# Patient Record
Sex: FEMALE | Race: WHITE | NOT HISPANIC OR LATINO | URBAN - METROPOLITAN AREA
[De-identification: names, ages, dates, MRNs, and addresses within clinical notes are randomized per-mention and may not be internally consistent; named-entity substitution may affect disease eponyms.]

---

## 2018-03-30 ENCOUNTER — THERAPY VISIT (OUTPATIENT)
Dept: PHYSICAL THERAPY | Facility: CLINIC | Age: 28
End: 2018-03-30
Payer: COMMERCIAL

## 2018-03-30 DIAGNOSIS — M54.50 RIGHT-SIDED LOW BACK PAIN WITHOUT SCIATICA: Primary | ICD-10-CM

## 2018-03-30 PROCEDURE — 97014 ELECTRIC STIMULATION THERAPY: CPT | Mod: GP | Performed by: PHYSICAL THERAPIST

## 2018-03-30 PROCEDURE — 97110 THERAPEUTIC EXERCISES: CPT | Mod: GP | Performed by: PHYSICAL THERAPIST

## 2018-03-30 PROCEDURE — 97161 PT EVAL LOW COMPLEX 20 MIN: CPT | Mod: GP | Performed by: PHYSICAL THERAPIST

## 2018-03-30 NOTE — PROGRESS NOTES
"[facility] Initial Evaluation  Subjective:  Physical Therapy Initial Evaluation   3/30/18  Fe Jaeger DC Precautions/Restrictions/MD instructions: SIJ/LBP E and T   Therapist Impression:   Pt is a 27 y/o female, with 6 month history of R sided LBP/glute pain . Pt presents with pain, reduced ROM, weakness, reduced motor control consistent with mechanical LBP. These impairments limit their ability to sit, work, and perform school duties. Skilled PT services necessary in order to reduce impairments and improve independent function.    Subjective:   Chief Complaint: Pt reports 6 month hx of R glute and lateral back \"heaviness.\" Notes that it seems like a ball of tightness and sometimes throbbing pain. Notes that walking can completely relieve the pain. Intermittent tingling in R leg. No change in bowel/bladder function  DOI/onset: Sept/Oct 2017 DOS: none  Location: see above Quality: throbbing, achiness  Frequency: with sitting Radiates: some achiness/pain down R LE to foot  Pain scale: Rest 0/10 Activity 7-8/10   Time of day: PM worse than AM Sleeping: relief from sleeping   Exacerbated by: sitting >10 min Relieved by: walking Progression: not much change  Previous Treatment: chiro, yoga Effect of prior treatment: helpful in the short term but hasn't had an effect beyond 2 weeks   PMH and/or surgical history: generalized anxiety   Imaging: none    Occupation: [employer] student Job duties: prolonged sitting    Current HEP/exercise regimen: enjoys hiking, doing intense yoga Patient's goals: reduce pain, return to normal yoga and hiking  Medications: none  General health as reported by patient: good  Return to MD: PRN  HPI                  Objective:  Lumbar Spine/SIJ Evaluation:    Gait: Incr L SGL    Posture: incr lordosis, L SGL     Lumbar AROM:   Motion ROM Pain   Flexion To floor nil   Extension Mod loss nil   Side Bend L WNL R stretchy/pain   Side Bend R WNL nil   Side Gliding L WNL nil "   Side Gliding R Min loss R side     Myotomes/Dermatomes/Reflexes:  WNL and symmetrical       Hip PROM hyper    SI Joint Provocation Tests:  Negative    Muscle Activation    L R   TrA fair poor   Glute Max 3+/5 3+/5     Painful R glute med testing    System    Physical Exam    General     ROS    Assessment/Plan:    Patient is a 26 year old female with lumbar complaints.    Patient has the following significant findings with corresponding treatment plan.                Diagnosis 1:  Mechanical LBP  Pain -  hot/cold therapy, electric stimulation, manual therapy, splint/taping/bracing/orthotics, education, directional preference exercise and home program  Decreased ROM/flexibility - manual therapy and therapeutic exercise  Decreased joint mobility - manual therapy and therapeutic exercise  Decreased strength - therapeutic exercise and therapeutic activities  Decreased proprioception - neuro re-education and therapeutic activities  Impaired gait - gait training  Impaired muscle performance - neuro re-education  Decreased function - therapeutic activities  Impaired posture - neuro re-education    Therapy Evaluation Codes:   1) History comprised of:   Personal factors that impact the plan of care:      Past/current experiences and Time since onset of symptoms.    Comorbidity factors that impact the plan of care are:      None.     Medications impacting care: None.  2) Examination of Body Systems comprised of:   Body structures and functions that impact the plan of care:      Lumbar spine and Pelvis.   Activity limitations that impact the plan of care are:      Driving, Sitting and Working.  3) Clinical presentation characteristics are:   Stable/Uncomplicated.  4) Decision-Making    Low complexity using standardized patient assessment instrument and/or measureable assessment of functional outcome.  Cumulative Therapy Evaluation is: Low complexity.    Previous and current functional limitations:  (See Goal Flow Sheet for  this information)    Short term and Long term goals: (See Goal Flow Sheet for this information)     Communication ability:  Patient appears to be able to clearly communicate and understand verbal and written communication and follow directions correctly.  Treatment Explanation - The following has been discussed with the patient:   RX ordered/plan of care  Anticipated outcomes  Possible risks and side effects  This patient would benefit from PT intervention to resume normal activities.   Rehab potential is excellent.    Frequency:  2 X a month, once daily  Duration:  for 3 months  Discharge Plan:  Achieve all LTG.  Independent in home treatment program.  Reach maximal therapeutic benefit.    Please refer to the daily flowsheet for treatment today, total treatment time and time spent performing 1:1 timed codes.

## 2018-03-30 NOTE — MR AVS SNAPSHOT
"              After Visit Summary   3/30/2018    Ms. Sheila Cruz    MRN: [id number]           Patient Information     Date Of Birth          1990        Visit Information        Provider Department      3/30/2018 8:20 AM Jeremy Melvin PT [facility] Physical [facility]        Today's Diagnoses     Right-sided low back pain without sciatica    -  1       Follow-ups after your visit        Who to contact     If you have questions or need follow up information about today's clinic visit or your schedule please contact [facility]THLETIC [facility] PHYSICAL [facility] directly at [phone].  Normal or non-critical lab and imaging results will be communicated to you by "[employer]hart, letter or phone within 4 business days after the clinic has received the results. If you do not hear from us within 7 days, please contact the clinic through "[employer]hart or phone. If you have a critical or abnormal lab result, we will notify you by phone as soon as possible.  Submit refill requests through [employer] or call your pharmacy and they will forward the refill request to us. Please allow 3 business days for your refill to be completed.          Additional Information About Your Visit        MyChart Information     [employer] lets you send messages to your doctor, view your test results, renew your prescriptions, schedule appointments and more. To sign up, go to [email or web address] . Click on \"Log in\" on the left side of the screen, which will take you to the Welcome page. Then click on \"Sign up Now\" on the right side of the page.     You will be asked to enter the access code listed below, as well as some personal information. Please follow the directions to create your username and password.     Your access code is: [email or web address]  Expires: 2018  9:13 AM     Your access code will  in 90 days. If you need help or a new code, please call your [facility] clinic or " [phone].        Care EveryWhere ID     This is your Care EveryWhere ID. This could be used by other organizations to access your [facility] medical records  [id number]         Blood Pressure from Last 3 Encounters:   No data found for BP    Weight from Last 3 Encounters:   No data found for Wt              We Performed the Following     ELECTRIC STIMULATION THERAPY     HC PT EVAL, LOW COMPLEXITY     NANCY INITIAL EVAL REPORT     THERAPEUTIC EXERCISES        Primary Care Provider    None Specified       No primary provider on file.        Equal Access to Services     PEG WILLIAMSON : Hadii aad ku hadasho Soomaali, waaxda luqadaha, qaybta kaalmada adeegyada, waxay berhanein hayashian misha arguetaroroena ely . So [facility] [phone].    ATENCIÓN: Si habla espcony, tiene a vang disposición servicios gratuitos de asistencia lingüística. Llame al [phone].    We comply with applicable federal civil rights laws and [place] laws. We do not discriminate on the basis of race, color, national origin, age, disability, sex, sexual orientation, or gender identity.            Thank you!     Thank you for choosing [facility] PHYSICAL THERAP[facility]  for your care. Our goal is always to provide you with excellent care. Hearing back from our patients is one way we can continue to improve our services. Please take a few minutes to complete the written survey that you may receive in the mail after your visit with us. Thank you!             Your Updated Medication List - Protect others around you: Learn how to safely use, store and throw away your medicines at www.disposemymeds.org.      Notice  As of 3/30/2018  9:13 AM    You have not been prescribed any medications.

## 2018-03-30 NOTE — LETTER
"[facility]IC [facility] PHYSICAL THERAPY  [address] [address]  [phone]    2018    Re: Sheila Cruz   :   1990  MRN:  [id number]   REFERRING PHYSICIAN:   Fe Jaeger    [facility]    Date of Initial Evaluation:  2018  Visits:  Rxs Used: 1  Reason for Referral:  Right-sided low back pain without sciatica    EVALUATION SUMMARY    [facility] Initial Evaluation  Subjective:  Physical Therapy Initial Evaluation   3/30/18  Fe Jaeger DC Precautions/Restrictions/MD instructions: SIJ/LBP E and T   Therapist Impression:   Pt is a 27 y/o female, with 6 month history of R sided LBP/glute pain . Pt presents with pain, reduced ROM, weakness, reduced motor control consistent with mechanical LBP. These impairments limit their ability to sit, work, and perform school duties. Skilled PT services necessary in order to reduce impairments and improve independent function.    Subjective:   Chief Complaint: Pt reports 6 month hx of R glute and lateral back \"heaviness.\" Notes that it seems like a ball of tightness and sometimes throbbing pain. Notes that walking can completely relieve the pain. Intermittent tingling in R leg. No change in bowel/bladder function  DOI/onset: Sept/Oct 2017 DOS: none  Location: see above Quality: throbbing, achiness  Frequency: with sitting Radiates: some achiness/pain down R LE to foot  Pain scale: Rest 0/10 Activity 7-8/10   Time of day: PM worse than AM Sleeping: relief from sleeping   Exacerbated by: sitting >10 min Relieved by: walking Progression: not much change  Previous Treatment: chiro, yoga Effect of prior treatment: helpful in the short term but hasn't had an effect beyond 2 weeks     Re: Sheila Cruz   :   1990    PMH and/or surgical history: generalized anxiety   Imaging: none    Occupation: [employer] student Job " duties: prolonged sitting    Current HEP/exercise regimen: enjoys hiking, doing intense yoga Patient's goals: reduce pain, return to normal yoga and hiking  Medications: none  General health as reported by patient: good  Return to MD: PRN  HPI                  Objective:  Lumbar Spine/SIJ Evaluation:    Gait: Incr L SGL  Posture: incr lordosis, L SGL   Lumbar AROM:   Motion ROM Pain   Flexion To floor nil   Extension Mod loss nil   Side Bend L WNL R stretchy/pain   Side Bend R WNL nil   Side Gliding L WNL nil   Side Gliding R Min loss R side     Myotomes/Dermatomes/Reflexes:  WNL and symmetrical    Hip PROM hyper  SI Joint Provocation Tests:  Negative  Muscle Activation    L R   TrA fair poor   Glute Max 3+/5 3+/5     Painful R glute med testing  Assessment/Plan:    Patient is a 26 year old female with lumbar complaints.    Patient has the following significant findings with corresponding treatment plan.                Diagnosis 1:  Mechanical LBP  Pain -  hot/cold therapy, electric stimulation, manual therapy, splint/taping/bracing/orthotics, education, directional preference exercise and home program  Decreased ROM/flexibility - manual therapy and therapeutic exercise  Re: Sheila Cruz   :   1990    Decreased joint mobility - manual therapy and therapeutic exercise  Decreased strength - therapeutic exercise and therapeutic activities  Decreased proprioception - neuro re-education and therapeutic activities  Impaired gait - gait training  Impaired muscle performance - neuro re-education  Decreased function - therapeutic activities  Impaired posture - neuro re-education  Therapy Evaluation Codes:   1) History comprised of:Personal factors that impact the plan of care:  Past/current experiences and Time since onset of symptoms. Comorbidity factors that impact the plan of care are:  None.  Medications impacting care: None.  2) Examination of Body Systems comprised of:Body structures and functions that  impact the plan of care:  Lumbar spine and Pelvis.Activity limitations that impact the plan of care are:  Driving, Sitting and Working.  3) Clinical presentation characteristics are: Stable/Uncomplicated.  4) Decision-MakingLow complexity using standardized patient assessment instrument and/or measureable assessment of functional outcome.  Cumulative Therapy Evaluation is: Low complexity.  Previous and current functional limitations:  (See Goal Flow Sheet for this information)    Short term and Long term goals: (See Goal Flow Sheet for this information)   Communication ability:  Patient appears to be able to clearly communicate and understand verbal and written communication and follow directions correctly.  Treatment Explanation - The following has been discussed with the patient:   RX ordered/plan of care  Anticipated outcomes  Possible risks and side effects  This patient would benefit from PT intervention to resume normal activities.   Rehab potential is excellent.  Frequency:  2 X a month, once daily  Duration:  for 3 months  Discharge Plan:  Achieve all LTG.  Independent in home treatment program.  Reach maximal therapeutic benefit.  Please refer to the daily flowsheet for treatment today, total treatment time and time spent performing 1:1 timed codes.   Thank you for your referral.    INQUIRIES  Therapist: Jeremy Melvin DPT  [facility] PHYSICAL THERAPY  [address] [place] [id number]  Phone: [phone]  Fax: [phone]

## 2019-01-10 ENCOUNTER — COMMUNICATION - HEALTHEAST (OUTPATIENT)
Dept: TELEHEALTH | Facility: CLINIC | Age: 29
End: 2019-01-10

## 2019-01-10 ENCOUNTER — OFFICE VISIT - HEALTHEAST (OUTPATIENT)
Dept: FAMILY MEDICINE | Facility: CLINIC | Age: 29
End: 2019-01-10

## 2019-01-10 DIAGNOSIS — F41.1 GENERALIZED ANXIETY DISORDER: ICD-10-CM

## 2019-01-10 DIAGNOSIS — Z12.4 SCREENING FOR CERVICAL CANCER: ICD-10-CM

## 2019-01-10 DIAGNOSIS — Z00.00 ROUTINE GENERAL MEDICAL EXAMINATION AT A HEALTH CARE FACILITY: ICD-10-CM

## 2019-01-10 DIAGNOSIS — L68.0 HIRSUTISM: ICD-10-CM

## 2019-01-10 LAB
FSH SERPL-ACNC: 6.2 MIU/ML
HBA1C MFR BLD: 4.8 % (ref 3.5–6)
PROLACTIN SERPL-MCNC: 9.9 NG/ML (ref 0–20)
TSH SERPL DL<=0.005 MIU/L-ACNC: 1.53 UIU/ML (ref 0.3–5)

## 2019-01-10 ASSESSMENT — MIFFLIN-ST. JEOR: SCORE: 1329.63

## 2019-01-11 LAB — TESTOST SERPL-MCNC: 18 NG/DL (ref 14–53)

## 2019-01-15 LAB
BKR LAB AP ABNORMAL BLEEDING: NO
BKR LAB AP BIRTH CONTROL/HORMONES: NORMAL
BKR LAB AP CERVICAL APPEARANCE: NORMAL
BKR LAB AP GYN ADEQUACY: NORMAL
BKR LAB AP GYN INTERPRETATION: NORMAL
BKR LAB AP HPV REFLEX: NORMAL
BKR LAB AP LMP: NORMAL
BKR LAB AP PATIENT STATUS: NORMAL
BKR LAB AP PREVIOUS ABNORMAL: NORMAL
BKR LAB AP PREVIOUS NORMAL: 2014
HIGH RISK?: NO
PATH REPORT.COMMENTS IMP SPEC: NORMAL
RESULT FLAG (HE HISTORICAL CONVERSION): NORMAL

## 2020-02-24 ENCOUNTER — OFFICE VISIT - HEALTHEAST (OUTPATIENT)
Dept: FAMILY MEDICINE | Facility: CLINIC | Age: 30
End: 2020-02-24

## 2020-02-24 ENCOUNTER — COMMUNICATION - HEALTHEAST (OUTPATIENT)
Dept: SCHEDULING | Facility: CLINIC | Age: 30
End: 2020-02-24

## 2020-02-24 DIAGNOSIS — M54.41 CHRONIC RIGHT-SIDED LOW BACK PAIN WITH RIGHT-SIDED SCIATICA: ICD-10-CM

## 2020-02-24 DIAGNOSIS — R11.0 NAUSEA: ICD-10-CM

## 2020-02-24 DIAGNOSIS — R10.84 ABDOMINAL PAIN, GENERALIZED: ICD-10-CM

## 2020-02-24 DIAGNOSIS — G89.29 CHRONIC RIGHT-SIDED LOW BACK PAIN WITH RIGHT-SIDED SCIATICA: ICD-10-CM

## 2020-02-24 DIAGNOSIS — G44.209 TENSION HEADACHE: ICD-10-CM

## 2020-02-24 ASSESSMENT — MIFFLIN-ST. JEOR: SCORE: 1328.72

## 2020-03-10 ENCOUNTER — COMMUNICATION - HEALTHEAST (OUTPATIENT)
Dept: PHYSICAL MEDICINE AND REHAB | Facility: CLINIC | Age: 30
End: 2020-03-10

## 2020-03-15 ENCOUNTER — NURSE TRIAGE (OUTPATIENT)
Dept: NURSING | Facility: CLINIC | Age: 30
End: 2020-03-15

## 2020-03-16 NOTE — TELEPHONE ENCOUNTER
"Was at the [employer]. Was at the night before it closed due to employee with COVID-19.  Has a cough and had other symptoms, that have gotten better, such as really bad headache and muscle aches,  no fever.  Still does have a cough now, but \"not bad\".    Did have difficulty breathing, when initally had the cough, but not today.    Advised patient to utilize [employer] for a virtual visit and to isolate at home.    Radhika Mendoza RN on 3/15/2020 at 10:02 PM    Reason for Disposition    [1] Cough occurs AND [2] within 14 days of CORONAVIRUS EXPOSURE    Additional Information    Negative: Severe difficulty breathing (e.g., struggling for each breath, speak in single words, bluish lips)    Negative: Sounds like a life-threatening emergency to the triager    Negative: [1] Difficulty breathing (shortness of breath) occurs AND [2] onset > 14 days after CORONAVIRUS EXPOSURE (Close Contact)    Negative: [1] Dry cough occurs AND [2] onset > 14 days after CORONAVIRUS EXPOSURE    Negative: [1] Wet cough (i.e., white-yellow, yellow, green, or trina colored sputum) AND [2] onset > 14 days after CORONAVIRUS EXPOSURE    Negative: [1] Common cold symptoms AND [2] onset > 14 days after CORONAVIRUS EXPOSURE    Negative: [1] Difficulty breathing occurs AND [2] within 14 days of CORONAVIRUS EXPOSURE    Negative: Patient sounds very sick or weak to the triager    Negative: [1] Fever or feeling feverish AND [2] within 14 Days of CORONAVIRUS EXPOSURE    [facility] Specific Disposition  - REQUIRED: [facility] Specific Patient Instructions  COVID 19 Nurse Triage Plan    Patient referred to virtual visit with a provider through [facility] (Preferred option). **Follow System Ambulatory Workflow for COVID 19 on instructions on how to access [facility]**     Instructions Given to Patient  It is recommended that you setup a virtual visit with one of our virtual providers.  To do this follow these instructions:    1. Go to the website " [email or web address]  2. Create an account (you will need your insurance information)  3. Start a new visit  4. Choose your diagnosis (e.g. COVID19)  5. Fill out the information about your symptoms  6. A provider will reach out to you by text, phone call or video visit based on your request    While you are at home please follow these instructions to care for yourself:    Isolate Yourself:  Isolate yourself at home.   Do Not allow any visitors  Do Not go to work or school  Do Not go to [facility],  centers, shopping, or other public places.  Do Not shake hands.  Avoid close contact with others (hugging, kissing).    Protect Others:  Cover Your Mouth and Nose with a mask, disposable tissue or wash cloth to avoid spreading germs to others.  Wash your hands and face frequently with soap and water.    Fever Medicines:  For fever relief, take acetaminophen or ibuprofen.  Treat fevers above 101  F (38.3  C) to lower fevers and make you more comfortable.   Acetaminophen (e.g., Tylenol): Take 650 mg (two 325 mg pills) by mouth every 4-6 hours as needed of regular strength Tyleno or 1,000 mg (two 500 mg pills) every 8 hours as needed of Extra Strength Tylenol.   Ibuprofen (e.g., Motrin, Advil): Take 400 mg (two 200 mg pills) by mouth every 6 hours as needed.   Acetaminophen is thought to be safer than ibuprofen or naproxen for people over 65 years old. Acetaminophen is in many OTC and prescription medicines. It might be in more than one medicine that you are taking. You need to be careful and not take an overdose. Before taking any medicine, read all the instructions on the package.  Caution -NSAIDs (e.g., ibuprofen, naproxen): Do not take nonsteroidal anti-inflammatory drugs (NSAIDs) if you have stomach problems, kidney disease, heart failure, or other contraindications to using this type of medicine. Do not take NSAID medicines for over 7 days without consulting your PCP. Do not take NSAID medicines if you are  pregnant. Do not take NSAID medicines if you are also taking blood thinners.     Call Back If: Breathing difficulty develops or you become worse.    Thank you for limiting contact with others, wearing a simple mask to cover your cough, practice good hand hygiene habits and accessing our virtual services where possible to limit the spread of this virus.    For more information about COVID19 and options for caring for yourself at home, please visit the CDC website at https://www.cdc.gov/coronavirus/2019-ncov/about/steps-when-sick.html  For more options for care at [facility], please visit our website at [email or web address]    Protocols used: CORONAVIRUS (2019-NCOV) EXPOSURE-A-AH

## 2020-10-12 ENCOUNTER — COMMUNICATION - HEALTHEAST (OUTPATIENT)
Dept: FAMILY MEDICINE | Facility: CLINIC | Age: 30
End: 2020-10-12

## 2020-10-12 DIAGNOSIS — F41.1 GENERALIZED ANXIETY DISORDER: ICD-10-CM

## 2020-10-14 ENCOUNTER — OFFICE VISIT - HEALTHEAST (OUTPATIENT)
Dept: FAMILY MEDICINE | Facility: CLINIC | Age: 30
End: 2020-10-14

## 2020-10-14 DIAGNOSIS — Z30.46 ENCOUNTER FOR SURVEILLANCE OF IMPLANTABLE SUBDERMAL CONTRACEPTIVE: ICD-10-CM

## 2020-10-14 DIAGNOSIS — F41.1 GENERALIZED ANXIETY DISORDER: ICD-10-CM

## 2020-10-14 RX ORDER — HYDROXYZINE HYDROCHLORIDE 10 MG/1
TABLET, FILM COATED ORAL
Qty: 90 TABLET | Refills: 3 | Status: SHIPPED | OUTPATIENT
Start: 2020-10-14 | End: 2021-11-04

## 2021-06-02 VITALS — WEIGHT: 142.5 LBS | BODY MASS INDEX: 26.22 KG/M2 | HEIGHT: 62 IN

## 2021-06-04 VITALS
SYSTOLIC BLOOD PRESSURE: 102 MMHG | WEIGHT: 142.3 LBS | DIASTOLIC BLOOD PRESSURE: 56 MMHG | HEIGHT: 62 IN | HEART RATE: 56 BPM | BODY MASS INDEX: 26.19 KG/M2
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
## 2021-06-12 NOTE — TELEPHONE ENCOUNTER
Please let her know I sent in 10mg tabs, there are no 12.5mg tabs.  She can take 1-2 of these.  She is due for a med check as well.  Fine to do this virtual.

## 2021-06-12 NOTE — PROGRESS NOTES
"Sheila Cruz is a 30 y.o. female who is being evaluated via a billable telephone visit.      The patient has been notified of following:     \"This telephone visit will be conducted via a call between you and your physician/provider. We have found that certain health care needs can be provided without the need for a physical exam.  This service lets us provide the care you need with a short phone conversation.  If a prescription is necessary we can send it directly to your pharmacy.  If lab work is needed we can place an order for that and you can then stop by our lab to have the test done at a later time.    Telephone visits are billed at different rates depending on your insurance coverage. During this emergency period, for some insurers they may be billed the same as an in-person visit.  Please reach out to your insurance provider with any questions.    If during the course of the call the physician/provider feels a telephone visit is not appropriate, you will not be charged for this service.\"    Patient has given verbal consent to a Telephone visit? Yes    What phone number would you like to be contacted at? [phone]     Patient would like to receive their AVS by AVS Preference: Adam.    Additional provider notes:       Subjective   Chief Complaint:  Follow-up (Med check) and Medication Refill    HPI:   Sheila Cruz is a 30 y.o. female who presents for medication check    History of CLARICE.  Has historically used hydroxyzine prn for symptoms.  She states anxiety has increased maybe just slightly with COVID, however for the most part she feels this is controlled. Working from home, still quite busy.  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 Anxiety occurs primarily at night.  She takes 1/2 tab hydroxyznie (12.5mg) in the evening to help her calm down and sleep.  Has found full tab too sedating.  Some dry " mouth but otherwise tolerating well.     Contraception:  Nexplanon placed 12/2016.  Will have a lapse in insurance and unable to remove this.  Now almost one year overdue.  Wondering about possible side effects and dangers of this.       Allergies:  has No Known Allergies.    SH/FH:  Social History and Family History reviewed and updated.   Tobacco Status:  She  reports that she has never smoked. She has never used smokeless tobacco.    Review of Systems:  A complete head to toe ROS is negative unless otherwise noted in HPI    Assessment & Plan   1. Generalized anxiety disorder  Anxiety remains well controlled.  Has experienced some reduced stress working from home and this has helped with daytime anxiety . Continues to use hydroxyzine 12.5mg only in the evenings/HS. Prefers to not have to break tablet so will send in 10mg tablet to try and may take 1-2.  Tolerating well. Recheck one year or sooner as needed.   - hydrOXYzine HCL (ATARAX) 10 MG tablet; Take 1-2 tabs three times a day as needed for anxiety  Dispense: 90 tablet; Refill: 3    2. Encounter for surveillance of implantable subdermal contraceptive  Placed 12/2016, now almost four years.  Discussed some studies have shown duration of action up to five years but did recommend backup contraception . The longer this is in the more difficult it may be to remove, however no other concerns with waiting until she has new insurance in place next year to remove it.      Nai Díaz CNP    Phone call duration:  9 minutes

## 2021-06-12 NOTE — TELEPHONE ENCOUNTER
Refill Request  Did you contact pharmacy: No  Medication name: hydrOXYzine HCl (ATARAX) 25 MG tablet  Requested Prescriptions      No prescriptions requested or ordered in this encounter     Who prescribed the medication: Nai Díaz CNP    Requested Pharmacy: [facility]  Is patient out of medication: Yes  Patient notified refills processed in 3 business days:  yes  Okay to leave a detailed message: yes    Patient is wondering if she can get a smaller dose of the medication like a 15 mg as she is breaking the 25 mg in half and uses only when needed. Patient stated if not she Is fine with that.

## 2021-06-20 NOTE — LETTER
Letter by Richard Allen at      Author: Richard Allen Service: -- Author Type: --    Filed:  Encounter Date: 3/10/2020 Status: (Other)         Sheila Cruz  [address] [place] [address]                 March 10, 2020       Dear Ms. Cruz,    At [facility], we care about your health and well-being. Recently, we received a  referral to get you scheduled at the [facility]. We have attempted to  assist you in scheduling this appointment and have been unsuccessful in reaching you.    Please call our Intake line at your earliest convenience for assistance in scheduling an  appointment. Thank you for choosing [facility].      Sincerely,        [facility] Intake team  [phone]

## 2021-06-23 NOTE — PATIENT INSTRUCTIONS - HE
Recommendations from today's visit                                                       1. For anxiety, try hydroxyzine 25-50mg as needed.  This will make you feel groggy so careful with use during the day.  Effective for evening and sleep.  If you find this is not effective enough, we could consider a daily anxiety medication as well.      2. Excessive hair growth:  We will do some hormone testing today and schedule you for a pelvic ultrasound.        Next appointment: one year or sooner as needed    To reschedule your appointment, please call the clinic directly at [phone].   It was a pleasure seeing you today! I look forward to seeing you again.

## 2021-06-23 NOTE — PROGRESS NOTES
FEMALE PREVENTIVE EXAM    Assessment & Plan   1. Routine general medical examination at a health care facility  Nonfasting labs and Pap today.  If normal repeat in 3 years.    2. Generalized anxiety disorder  Symptoms consistent with more generalized anxiety disorder, however her symptoms are primarily worse in the evening and she is experiencing difficulty with sleep.  She would like to start with a as needed medication so we will again try hydroxyzine as needed.  Warned of potential to cause grogginess with this so would recommend taking primarily in the evening or at bedtime.  If she finds this is not helpful or anxiety during the day continues to be bothersome for her would consider trial of SSRI.  She will schedule a follow-up appointment if this is the case.  - hydrOXYzine HCl (ATARAX) 25 MG tablet; Take 1-2 tabs three times a day as needed for anxiety  Dispense: 90 tablet; Refill: 0    3. Hirsutism  Onset of hirsutism 2 years.  Discussed laboratory workup and possibility of PCOS.  She does have regular periods.  She is interested in a pelvic ultrasound to complete the workup for this.  We will schedule and follow-up with results.  - US Pelvis With Transvaginal Non OB; Future  - Follicle Stimulating Hormone (FSH)  - Testosterone, Total  - Prolactin  - Glycosylated Hemoglobin A1c  - Thyroid Cascade    4. Screening for cervical cancer    - Gynecologic Cytology (PAP Smear)    Recommend repeat pap smear if normal every three years, Recommended adequate calcium intake/osteoporosis prevention, Discussed breast cancer screening guidelines, Discussed safe sex practices and Discussed diet, including moderation of portions sizes, avoiding eating out and fast food and increase in fruits and vegetables    Nai Díaz CNP    Subjective:   Chief Complaint:  Establish Care; Annual Exam (not fasting - pap); Hip Pain (pt states that she has been going on for about 2 years, currently going to PT for this); Hormone (abnormal  body hair growth); and Anxiety    HPI:  Shiela Cruz is a 28 y.o. female who presents for routine physical exam.  She is a new patient to the clinic.  PMH notable for CLARICE, otherwise negative.  She works as a  for a private company contracted through [employer].  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    Hirsutism: Has noted growth of hair on the arms abdomen and face over the last 2 years.  Previously no difficulties with this.  She is currently using the Nexplanon for contraception.  Is experiencing a menstrual cycle monthly.  She has not experienced any recent weight gain.  Does have a family medical history of diabetes in her father.  No other physical complaints    SI joint pain: Currently following with physical therapy.  She has noted significant improvement with this and also has a consult with a chiropractor this afternoon.    Anxiety: Previous diagnosis of generalized anxiety disorder in 2014.  At that time was given a prescription for hydroxyzine though did not really take it.  She would like to discuss potentially obtaining a new prescription.  She states anxiety is present on a daily basis.  Primarily in the evenings when trying to sleep though is present during the day as well.  Describes it as mind racing, and ability to relax.  She has difficulty leaving her work at home.  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  Also experiences physical symptoms such as heart racing and hyperhidrosis.  Has not previously been on an SSRI.    OB/Gyn History:  Menstrual history: regular periods  Date of previous pap: 2014  History of abnormal pap: none  Current Contraceptive method: Nexplanon    Preventive Health:  Reviewed and recommended screening and treatment recommendations:  Immunizations: up to date, per her report Td in     Health Habits:    Exercise: Yes, regularly.  Calcium intake/Osteoporosis prevention:  "Yes    PMH:   There is no problem list on file for this patient.      No past medical history on file.    Current Medications: Reviewed   Current Outpatient Medications on File Prior to Visit   Medication Sig Dispense Refill     etonogestrel (NEXPLANON) 68 mg Impl implant Placed 12/16             No current facility-administered medications on file prior to visit.        Allergies:  Reviewed  has No Known Allergies.    Social History:  Social History     Occupational History     Not on file   Tobacco Use     Smoking status: Never Smoker     Smokeless tobacco: Never Used   Substance and Sexual Activity     Alcohol use: Yes     Frequency: 2-4 times a month     Drug use: No     Sexual activity: Not on file       Family History:   No family history on file.      Review of Systems:  Complete head to toe review of systems is otherwise negative except as above.    Objective:    /68 (Patient Site: Right Arm, Patient Position: Sitting, Cuff Size: Adult Regular)   Pulse 60   Ht 5' 2\" (1.575 m)   Wt 142 lb 8 oz (64.6 kg)   LMP 12/27/2018 (Exact Date)   Breastfeeding? No   BMI 26.06 kg/m      GENERAL: Alert, well-appearing female .   PSYCH: Pleasant mood, affect appropriate.  Good judgment and insight.  Intact recent and remote memory. Good eye contact.  SKIN: No atypical lesions  EYES: Conjunctiva pink, sclera white, no exudates. FARZANEH.  EOMs intact.  EARS: TMs pearly grey, no bulging, redness, retraction.   MOUTH: Pharynx moist, pink without exudate. No tonsillar enlargement  NECK: No lymphadenopathy. Thyroid borders smooth without enlargement, nodules.   CV: Regular rate and rhythm without murmurs, rubs or gallops.  RESP: Lung sounds clear, symmetric excursion.   ABDOMEN: BS+. Abdomen soft.  No organomegaly  BREASTS: Breasts symmetric, no dimpling, masses or skin discolorations seen. Areolas and nipples symmetric without discharge. On palpation, breast tissue supple and nontender. No masses or nodules. Axillary " and epitrochlear lymph nodes nonpalpable.    FEMALE: External genitalia without lesions. Vaginal walls and cervix without lesions or masses. No abnormal discharge. Pap smear  obtained. On bimanual palpation, uterus mobile, normal shape and contour. No adnexal masses or tenderness.   PV :  No edema

## 2021-08-14 ENCOUNTER — HEALTH MAINTENANCE LETTER (OUTPATIENT)
Age: 31
End: 2021-08-14

## 2021-10-10 ENCOUNTER — HEALTH MAINTENANCE LETTER (OUTPATIENT)
Age: 31
End: 2021-10-10

## 2021-11-04 DIAGNOSIS — F41.1 GENERALIZED ANXIETY DISORDER: ICD-10-CM

## 2021-11-04 NOTE — TELEPHONE ENCOUNTER
Pending Prescriptions:                       Disp   Refills    hydrOXYzine (ATARAX) 10 MG tablet         10 tab*0            Sig: [HYDROXYZINE HCL (ATARAX) 10 MG TABLET] Take 1-2           tabs three times a day as needed for anxiety    Associated diagnosis:  Generalized anxiety disorder    Patient is in between jobs right now and can't afford an office visit.  She only uses this medication once or twice a week for sleep.  She is hoping Nai will fill this small quantity for her.

## 2021-11-04 NOTE — TELEPHONE ENCOUNTER
Reason for Call:  Medication or medication refill:    Do you use a [facility]?  Name of the pharmacy and phone number for the current request:  [facility]-updated pharm    Name of the medication requested:   hydrOXYzine HCL (ATARAX) 10 MG tablet 90 tablet 3 10/14/2020  No   Sig: [HYDROXYZINE HCL (ATARAX) 10 MG TABLET] Take 1-2 tabs three times a day as needed for anxiety         Other request: pt only takes about once per week    Can we leave a detailed message on this number? YES    Phone number patient can be reached at: Cell number on file:    Telephone Information:   Mobile [phone]       Best Time: any    Call taken on 11/4/2021 at 4:16 PM by Pam J. Behr

## 2021-11-05 RX ORDER — HYDROXYZINE HYDROCHLORIDE 10 MG/1
TABLET, FILM COATED ORAL
Qty: 30 TABLET | Refills: 0 | Status: SHIPPED | OUTPATIENT
Start: 2021-11-05

## 2022-09-18 ENCOUNTER — HEALTH MAINTENANCE LETTER (OUTPATIENT)
Age: 32
End: 2022-09-18

## 2023-10-08 ENCOUNTER — HEALTH MAINTENANCE LETTER (OUTPATIENT)
Age: 33
End: 2023-10-08

## 2024-12-20 ENCOUNTER — PRE VISIT (OUTPATIENT)
Dept: UROLOGY | Facility: CLINIC | Age: 34
End: 2024-12-20